# Patient Record
Sex: FEMALE | Race: BLACK OR AFRICAN AMERICAN | NOT HISPANIC OR LATINO | URBAN - METROPOLITAN AREA
[De-identification: names, ages, dates, MRNs, and addresses within clinical notes are randomized per-mention and may not be internally consistent; named-entity substitution may affect disease eponyms.]

---

## 2020-11-21 ENCOUNTER — EMERGENCY (EMERGENCY)
Facility: HOSPITAL | Age: 45
LOS: 0 days | Discharge: HOME | End: 2020-11-21
Payer: COMMERCIAL

## 2020-11-21 VITALS
SYSTOLIC BLOOD PRESSURE: 127 MMHG | DIASTOLIC BLOOD PRESSURE: 73 MMHG | WEIGHT: 220.02 LBS | OXYGEN SATURATION: 98 % | TEMPERATURE: 98 F | HEART RATE: 86 BPM | HEIGHT: 62 IN | RESPIRATION RATE: 16 BRPM
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
## 2020-11-21 DIAGNOSIS — F17.200 NICOTINE DEPENDENCE, UNSPECIFIED, UNCOMPLICATED: ICD-10-CM

## 2020-11-21 DIAGNOSIS — M25.562 PAIN IN LEFT KNEE: ICD-10-CM
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
## 2020-11-21 DIAGNOSIS — S80.912A UNSPECIFIED SUPERFICIAL INJURY OF LEFT KNEE, INITIAL ENCOUNTER: ICD-10-CM

## 2020-11-21 PROCEDURE — 99283 EMERGENCY DEPT VISIT LOW MDM: CPT

## 2020-11-21 PROCEDURE — 73562 X-RAY EXAM OF KNEE 3: CPT | Mod: 26,LT

## 2020-11-21 RX ORDER — IBUPROFEN 200 MG
600 TABLET ORAL ONCE
Refills: 0 | Status: COMPLETED | OUTPATIENT
Start: 2020-11-21 | End: 2020-11-21

## 2020-11-21 RX ADMIN — Medication 600 MILLIGRAM(S): at 14:41

## 2020-11-21 NOTE — ED ADULT TRIAGE NOTE - CHIEF COMPLAINT QUOTE
43 y/o female with no PMH. Presents to the ED with c/c of left knee pain & swelling.  for the past 3 days pt has been with left knee pain related to over use at work.

## 2020-11-21 NOTE — ED PROVIDER NOTE - CLINICAL SUMMARY MEDICAL DECISION MAKING FREE TEXT BOX
45 yo female with no significant pmh presents to the ED c/o left knee pain s/p knelling on knee 3/4 days ago. No fall or trauma. Pain is constant, worse with movement and ambulation. Associated with swelling to knee. No previous knee injury or surgery. Denies fever, chills, chest pain, sob, abd pain, LE weakness or paresthesias.  A/P: knee xray, naids, follow up with ortho. no obvious fracture. No signs or symptoms of joint infection.

## 2020-11-21 NOTE — ED PROVIDER NOTE - SKIN, MLM
Skin normal color for race, warm, dry and intact. No evidence of rash. + mild swelling to left anterior/lateral knee joint. no erythema or increased warmth. no rash. cap refill < 2 sec

## 2020-11-21 NOTE — ED ADULT NURSE NOTE - NSIMPLEMENTINTERV_GEN_ALL_ED
Implemented All Universal Safety Interventions:  [place] to call system. Call bell, personal items and telephone within reach. Instruct patient to call for assistance. Room bathroom lighting operational. Non-slip footwear when patient is off stretcher. Physically safe environment: no spills, clutter or unnecessary equipment. Stretcher in lowest position, wheels locked, appropriate side rails in place.

## 2020-11-21 NOTE — ED ADULT NURSE NOTE - OBJECTIVE STATEMENT
Pt A&Ox3, c/o left knee pain with swelling s/p bending 1 week ago. Pt states pain worsens with activity

## 2020-11-21 NOTE — ED ADULT NURSE NOTE - CHIEF COMPLAINT QUOTE
45 y/o female with no PMH. Presents to the ED with c/c of left knee pain & swelling.  for the past 3 days pt has been with left knee pain related to over use at work.

## 2020-11-21 NOTE — ED PROVIDER NOTE - NSFOLLOWUPINSTRUCTIONS_ED_ALL_ED_FT

## 2020-11-21 NOTE — ED PROVIDER NOTE - PATIENT PORTAL LINK FT
You can access the FollowMyHealth Patient Portal offered by [facility] by registering at the following website: [email or web address]. By joining [employer]’s FollowMyHealth portal, you will also be able to view your health information using other applications (apps) compatible with our system.

## 2020-11-21 NOTE — ED PROVIDER NOTE - CARE PROVIDER_API CALL
Demarco Angulo  ORTHOPAEDIC SURGERY  [address], [address]  [place] [address]  Phone: [phone]  Fax: [phone]  Follow Up Time: 1-3 Days

## 2020-11-21 NOTE — ED PROVIDER NOTE - OBJECTIVE STATEMENT
43 yo female with no significant pmh presents to the ED c/o left knee pain s/p knelling on knee 3/4 days ago. No fall or trauma. Pain is constant, worse with movement and ambulation. Associated with swelling to knee. No previous knee injury or surgery. Denies fever, chills, chest pain, sob, abd pain, LE weakness or paresthesias.

## 2025-06-09 NOTE — ED PROVIDER NOTE - MUSCULOSKELETAL MINIMAL EXAM
2025    Alfonso Riddle   1993        To Whom it May Concern;    Please excuse Alfonso Riddle from work due to illness. She may return to work when symptoms have improved for at least 24 hours.    Sincerely,        Mariama Nava PA-C  
+ TTP left lateral anterior knee joint with mild effusion. FROM of left knee. no joint laxiaty. dp pulses equal and intact bilaterally normal ambulation.